# Patient Record
Sex: MALE | Race: WHITE | NOT HISPANIC OR LATINO
[De-identification: names, ages, dates, MRNs, and addresses within clinical notes are randomized per-mention and may not be internally consistent; named-entity substitution may affect disease eponyms.]

---

## 2021-09-08 PROBLEM — Z00.00 ENCOUNTER FOR PREVENTIVE HEALTH EXAMINATION: Status: ACTIVE | Noted: 2021-09-08

## 2021-09-09 ENCOUNTER — APPOINTMENT (OUTPATIENT)
Dept: OTOLARYNGOLOGY | Facility: CLINIC | Age: 41
End: 2021-09-09

## 2022-11-29 ENCOUNTER — NON-APPOINTMENT (OUTPATIENT)
Age: 42
End: 2022-11-29

## 2022-12-01 ENCOUNTER — TRANSCRIPTION ENCOUNTER (OUTPATIENT)
Age: 42
End: 2022-12-01

## 2022-12-01 ENCOUNTER — APPOINTMENT (OUTPATIENT)
Dept: PLASTIC SURGERY | Facility: CLINIC | Age: 42
End: 2022-12-01

## 2022-12-01 VITALS — TEMPERATURE: 97.5 F | HEIGHT: 68 IN | WEIGHT: 175 LBS | BODY MASS INDEX: 26.52 KG/M2

## 2022-12-01 PROCEDURE — 99203 OFFICE O/P NEW LOW 30 MIN: CPT

## 2022-12-01 RX ORDER — PREGABALIN 75 MG/1
75 CAPSULE ORAL
Qty: 60 | Refills: 0 | Status: COMPLETED | COMMUNITY
Start: 2022-10-04

## 2022-12-01 RX ORDER — MUPIROCIN 20 MG/G
2 OINTMENT TOPICAL
Qty: 22 | Refills: 0 | Status: COMPLETED | COMMUNITY
Start: 2022-07-21

## 2022-12-01 RX ORDER — TESTOSTERONE ENANTHATE 75 MG/.5ML
75 INJECTION SUBCUTANEOUS
Qty: 2 | Refills: 0 | Status: COMPLETED | COMMUNITY
Start: 2022-06-10

## 2022-12-01 NOTE — HISTORY OF PRESENT ILLNESS
[FreeTextEntry1] : Problem - mass on back present for 3 years. \par Patient has been seen by Dermatology. Dr Dominguez. \par No previous treatments.\par Itching. \par No bleeding, or drainage.\par No Imaging/Biopsy.\par Slowly growing,white  in color.\par No Infection or inflammation.\par No pain or discomfort.\par No personal hx of skin cancer, masses.\par No family hx of skin cancer.\par \par H/o depression , HTN, asthma\par \par

## 2022-12-08 ENCOUNTER — LABORATORY RESULT (OUTPATIENT)
Age: 42
End: 2022-12-08

## 2022-12-08 ENCOUNTER — APPOINTMENT (OUTPATIENT)
Dept: PLASTIC SURGERY | Facility: CLINIC | Age: 42
End: 2022-12-08
Payer: COMMERCIAL

## 2022-12-08 PROCEDURE — 13100 CMPLX RPR TRUNK 1.1-2.5 CM: CPT | Mod: 58

## 2022-12-08 PROCEDURE — 21930 EXC BACK LES SC < 3 CM: CPT

## 2022-12-08 NOTE — PROCEDURE
[FreeTextEntry6] : Preopdx: posterior trunk cyst \par Procedure: excisional biopsy  1.1 cm, and complex closure 1.1 cm back\par Anesthesia: local 1% w/epi\par Specimens: to path on formalin\par No complications\par \par Summary:\par IC obtained.  Lesion demarcated with marking pen.  1%lido with epinephrine injected.  15 blade used to incise full thickness.    1.1Cm  lesion excised in subcutaneous plane.   Hemostasis obtained with cautery.  Skin edges widely undermined and closed for a complex closure of  1.1 cm.  bacitracin and steristrips placed.  \par \par

## 2022-12-15 ENCOUNTER — APPOINTMENT (OUTPATIENT)
Dept: PLASTIC SURGERY | Facility: CLINIC | Age: 42
End: 2022-12-15

## 2022-12-15 DIAGNOSIS — L72.0 EPIDERMAL CYST: ICD-10-CM

## 2022-12-15 PROCEDURE — 99024 POSTOP FOLLOW-UP VISIT: CPT

## 2022-12-15 NOTE — HISTORY OF PRESENT ILLNESS
[FreeTextEntry1] :  DOP 12/08/22 Excisional biopsy of left flank mass. \par Path: pending \par Pt reports no complaints. No excessive bleeding. No fevers. No odor. No purulent discharge. No excessive pain.